# Patient Record
Sex: MALE | Race: WHITE | Employment: FULL TIME | URBAN - METROPOLITAN AREA
[De-identification: names, ages, dates, MRNs, and addresses within clinical notes are randomized per-mention and may not be internally consistent; named-entity substitution may affect disease eponyms.]

---

## 2021-10-28 ENCOUNTER — OFFICE VISIT (OUTPATIENT)
Dept: FAMILY MEDICINE CLINIC | Facility: CLINIC | Age: 26
End: 2021-10-28

## 2021-10-28 VITALS
WEIGHT: 243 LBS | HEIGHT: 71.5 IN | OXYGEN SATURATION: 98 % | HEART RATE: 87 BPM | RESPIRATION RATE: 18 BRPM | DIASTOLIC BLOOD PRESSURE: 64 MMHG | BODY MASS INDEX: 33.27 KG/M2 | SYSTOLIC BLOOD PRESSURE: 102 MMHG

## 2021-10-28 DIAGNOSIS — Z00.00 ROUTINE GENERAL MEDICAL EXAMINATION AT A HEALTH CARE FACILITY: Primary | ICD-10-CM

## 2021-10-28 DIAGNOSIS — N46.9 INFERTILITY MALE: ICD-10-CM

## 2021-10-28 DIAGNOSIS — Z23 NEED FOR DIPHTHERIA-TETANUS-PERTUSSIS (TDAP) VACCINE: ICD-10-CM

## 2021-10-28 PROCEDURE — 3074F SYST BP LT 130 MM HG: CPT | Performed by: FAMILY MEDICINE

## 2021-10-28 PROCEDURE — 3078F DIAST BP <80 MM HG: CPT | Performed by: FAMILY MEDICINE

## 2021-10-28 PROCEDURE — 3008F BODY MASS INDEX DOCD: CPT | Performed by: FAMILY MEDICINE

## 2021-10-28 PROCEDURE — 99385 PREV VISIT NEW AGE 18-39: CPT | Performed by: FAMILY MEDICINE

## 2021-10-28 PROCEDURE — 90471 IMMUNIZATION ADMIN: CPT | Performed by: FAMILY MEDICINE

## 2021-10-28 PROCEDURE — 90715 TDAP VACCINE 7 YRS/> IM: CPT | Performed by: FAMILY MEDICINE

## 2021-10-29 NOTE — PATIENT INSTRUCTIONS
Fast 8 hours for labs. Water, black coffee, or plain tea only. Any sugar in the system will alter the glucose level and triglyceride level. Please call [phone] or use [facility] to schedule for laboratories or radiology procedures.     Dr. Jolly Osler

## 2021-10-29 NOTE — PROGRESS NOTES
HPI:  Here for a physical.  He has been with his wife since they were 15years old. Between condoms and birth control pills they were using protection until 2 months prior to their wedding 2 years ago. Their anniversary just past October 25, 2019.   Short Minutes of Exercise per Session: Not on file  Stress:       Feeling of Stress : Not on file  Social Connections:       Frequency of Communication with Friends and Family: Not on file      Frequency of Social Gatherings with Friends and Family: Not on file no rash. EXAM:  /64   Pulse 87   Resp 18   Ht 5' 11.5\" (1.816 m)   Wt 243 lb (110.2 kg)   SpO2 98%   BMI 33.42 kg/m²   NAD  GENERAL: well developed, well nourished, in no apparent distress.   EARS: Bilateral pinna / tragus are WNL in appearance, E ago.2      Patient understood above plan and agreed to do labs within the next 30 days as well as to make all appointments for referrals if given within the next 30 days. Patient understands to contact office if unable to do so.

## 2022-02-16 ENCOUNTER — TELEPHONE (OUTPATIENT)
Dept: FAMILY MEDICINE CLINIC | Facility: CLINIC | Age: 27
End: 2022-02-16

## 2022-04-04 NOTE — TELEPHONE ENCOUNTER
Dr. Freedom Patrick office calling in regards to referral.  They state the referral needs to be changed. Can't have as office visit for the type nor can semen analysis be in the comment section. Patient needs semen analysis. They need referral updated and faxed back to them at [phone]. Patient has been trying to get an appointment with them. Please advise.
[facility], please update insurance and send back for referral request to Dr. Lety Shelley.
LMTCB
Latonia Rodrigues (Self) [phone] (H)   Authorized:   No answer. No VM set up.
Pt informed referral has been authorized.
Pt tried to sched his appt with Dr. Trice Escobar for sperm analysis and was advised a referral is needed.   pls advise
Referral entered
Returned call
Task completed.
done
No

## 2022-04-15 ENCOUNTER — LAB ENCOUNTER (OUTPATIENT)
Dept: LAB | Facility: HOSPITAL | Age: 27
End: 2022-04-15
Attending: FAMILY MEDICINE
Payer: COMMERCIAL

## 2022-04-15 DIAGNOSIS — N46.9 INFERTILITY MALE: ICD-10-CM

## 2022-04-15 PROCEDURE — 89320 SEMEN ANAL VOL/COUNT/MOT: CPT

## 2022-04-22 ENCOUNTER — PATIENT MESSAGE (OUTPATIENT)
Dept: FAMILY MEDICINE CLINIC | Facility: CLINIC | Age: 27
End: 2022-04-22

## 2022-04-22 PROBLEM — R86.8 LOW SPERM MOTILITY: Status: ACTIVE | Noted: 2022-04-22

## 2022-04-28 NOTE — TELEPHONE ENCOUNTER
From: Marva Toledo  Sent: 4/27/2022 6:41 PM CDT  To: [facility] Clinical Staff  Subject: semen analysis    Hello, thanks for the update, I was hoping you could call me and explain what all of this means whenever you have the time. Thank you.

## 2023-02-13 ENCOUNTER — PATIENT MESSAGE (OUTPATIENT)
Dept: FAMILY MEDICINE CLINIC | Facility: CLINIC | Age: 28
End: 2023-02-13

## 2023-02-14 NOTE — TELEPHONE ENCOUNTER
From: Kiran Toth  To: Jeremy Jeans, MD  Sent: 2/13/2023 5:59 PM CST  Subject: Test results    Hello, could you fax the lab results from my Semen Analysis to [phone]? Thank you.

## 2024-05-02 ENCOUNTER — OFFICE VISIT (OUTPATIENT)
Dept: FAMILY MEDICINE CLINIC | Facility: CLINIC | Age: 29
End: 2024-05-02
Payer: COMMERCIAL

## 2024-05-02 VITALS
OXYGEN SATURATION: 97 % | DIASTOLIC BLOOD PRESSURE: 78 MMHG | BODY MASS INDEX: 33.27 KG/M2 | WEIGHT: 243 LBS | HEART RATE: 84 BPM | HEIGHT: 71.5 IN | SYSTOLIC BLOOD PRESSURE: 124 MMHG | RESPIRATION RATE: 16 BRPM

## 2024-05-02 DIAGNOSIS — Z00.00 ROUTINE GENERAL MEDICAL EXAMINATION AT A HEALTH CARE FACILITY: Primary | ICD-10-CM

## 2024-05-02 DIAGNOSIS — B00.9 RECURRENT HERPES SIMPLEX: ICD-10-CM

## 2024-05-02 PROCEDURE — 99395 PREV VISIT EST AGE 18-39: CPT | Performed by: FAMILY MEDICINE

## 2024-05-02 RX ORDER — VALACYCLOVIR HYDROCHLORIDE 1 G/1
1000 TABLET, FILM COATED ORAL DAILY
Qty: 90 TABLET | Refills: 3 | Status: SHIPPED | OUTPATIENT
Start: 2024-05-02 | End: 2025-04-27

## 2024-05-02 NOTE — PROGRESS NOTES
HPI:  Here for a physical.    PAST MEDICAL HISTORY:  Past Medical History:    Injury of lumbar spine (HCC)    believe spondylolithesis     PAST SURGICAL HISTORY:  History reviewed. No pertinent surgical history.  MEDICATIONS:  Current Outpatient Medications   Medication Sig Dispense Refill    valACYclovir (VALTREX) 1 G Oral Tab Take 1 tablet (1,000 mg total) by mouth daily. 90 tablet 3     ALLERGIES: Patient has no known allergies.    History reviewed. No pertinent family history.    Social History     Socioeconomic History    Marital status:      Spouse name: Not on file    Number of children: Not on file    Years of education: Not on file    Highest education level: Not on file   Occupational History    Not on file   Tobacco Use    Smoking status: Never    Smokeless tobacco: Never   Vaping Use    Vaping status: Never Used   Substance and Sexual Activity    Alcohol use: Yes     Comment: 30 DRINKS PER MONTH     Drug use: Never    Sexual activity: Not on file   Other Topics Concern    Not on file   Social History Narrative    Not on file     Social Determinants of Health     Financial Resource Strain: Not on file   Food Insecurity: Not on file   Transportation Needs: Not on file   Physical Activity: Not on file   Stress: Not on file   Social Connections: Not on file   Housing Stability: Not on file       REVIEW OF SYSTEMS:  GENERAL: Feeling generally well.  EYES: No complaints of diplopia or blurred vision.  EARS: No complaints of tinnitus or decreased hearing acuity.  CARDIOVASCULAR: No complaints of chest pain with exertion, no PND, orthopnea, or edema. No decrease in exercise tolerance.  PULMONARY: No complaints of extreme shortness of breath with activity. No complaints of wheezing. No complaints of  hemoptysis.  GASTROINTESTINAL:No complaints of GERD symptoms. Denies hematochezia and melena. No complaints of  any new constipation or diarrhea.  No complaints of abdominal pain or cramping. Regular  stools.  GENITOURINARY: No complaints of dysuria, urgency or frequency; no erectile dysfunction.  MUSCULOSKELETAL: No complaints of arthralgias or myalgias.  NEURO: No complaints of any new headaches or change in headache pattern.  PSYCHE: No complaints of symptoms of depression or anxiety.  HEMATOLOGY: No complaints of bruising or excessive bleeding.  ENDOCRINE: No complaints of excessive thirst or urination; No complaints of  unexpected weight gain or weight loss.  SKIN: no new or changing moles reported.  Recurrent facial rash occurs on the right forehead.  He has images over the last few summers.  It seems to recur when it gets hot out.  It was diagnosed as a herpes lesion.  It certainly looks like shingles on the photographs.    EXAM:  /78   Pulse 84   Resp 16   Ht 5' 11.5\" (1.816 m)   Wt 243 lb (110.2 kg)   SpO2 97%   BMI 33.42 kg/m²   NAD  GENERAL: well developed, well nourished, in no apparent distress.  EARS: Bilateral pinna / tragus are WNL in appearance, External canals patent and without inflammation. Bilateral tympanic membranes pearly white. No effusions noted. Hearing grossly normal.  EYES: PERRLA, EOMI, bilateral sclera anicteric, non-injected. Conjunctiva pink.  NOSE: Mucosa appears healthy.   OROPHARYNX: Mucosa moist without lesions. Dentition intact and gums appear healthy.  NECK: Supple, No lymphadenopathy. No thyromegaly or lesions palpated.  CARDIOVASCULAR: RRR, NL S1 and S2, no murmurs. Bilateral carotids without bruit. No abdominal bruits auscultated. Bilateral dorsalis pedis and posterior tibial pulses 2+/4+. No edema of the bilateral lower extremities. No JVD noted.  PULMONARY: CTA bilaterally, good air exchange, no rhonchi, wheezes, or rales. No dullness to percussion.  ABDOMEN: Soft, nontender, nondistended, NABS x 4 quadrants. No HSM; no masses; no bruits.   GENITOURINARY: Scrotum and testes without lesions, no hernias.   LYMPHATIC: no lymphadenopathy palpated about the  anterior and posterior cervical chains, submandibular and supraclavicular areas, axilla, and inguinal areas.  EXTREMITIES / MUSCULOSKELETAL: 4 extremities with grossly normal ROM. Gait NL. No cyanosis, clubbing or edema.  NEUROLOGIC: CN's II-XII grossly intact, DTR's 2+/4+ throughout. Strength 5+/5+ x 4 ext. Alert and orientated.  SKIN: no suspicions lesions noted.  PSYCHIATRIC: Mood and affect appropriate.        ASSESSMENT / PLAN:  Routine health maintenance.  Labs ordered: see orders.  Administer Tetanus booster : 2021.  [facility] information discussed: none  Additional issues: obesity:  diet and exercise  Recurrent herpes like rash, possibly recurrent shingles.  Baby due in 1 month.  We are going to try to prophylax this with Valtrex 1 g daily.  He is aware that this recommendation is based on the recommendations for genital herpes.    Patient understood above plan and agreed to do labs within the next 30 days as well as to make all appointments for referrals if given within the next 30 days. Patient understands to contact office if unable to do so.

## (undated) DIAGNOSIS — N46.9 INFERTILITY MALE: Primary | ICD-10-CM

## (undated) NOTE — LETTER
11/30/21        Michael Jordan  [address] [address]      Dear Patel Rodriguez records indicate that you have outstanding lab work and or testing that was ordered for you and has not yet been completed:  Orders Placed This Encounter      Fertility S